# Patient Record
Sex: FEMALE | Race: WHITE
[De-identification: names, ages, dates, MRNs, and addresses within clinical notes are randomized per-mention and may not be internally consistent; named-entity substitution may affect disease eponyms.]

---

## 2019-06-12 ENCOUNTER — HOSPITAL ENCOUNTER (INPATIENT)
Dept: HOSPITAL 19 - COL.ER | Age: 79
LOS: 3 days | Discharge: HOME | DRG: 291 | End: 2019-06-15
Attending: INTERNAL MEDICINE | Admitting: INTERNAL MEDICINE
Payer: MEDICARE

## 2019-06-12 VITALS — WEIGHT: 216.05 LBS | BODY MASS INDEX: 46.61 KG/M2 | HEIGHT: 57.01 IN

## 2019-06-12 DIAGNOSIS — N17.9: ICD-10-CM

## 2019-06-12 DIAGNOSIS — R00.1: ICD-10-CM

## 2019-06-12 DIAGNOSIS — Z79.4: ICD-10-CM

## 2019-06-12 DIAGNOSIS — M10.9: ICD-10-CM

## 2019-06-12 DIAGNOSIS — G47.34: ICD-10-CM

## 2019-06-12 DIAGNOSIS — E11.22: ICD-10-CM

## 2019-06-12 DIAGNOSIS — N18.4: ICD-10-CM

## 2019-06-12 DIAGNOSIS — E66.01: ICD-10-CM

## 2019-06-12 DIAGNOSIS — E03.9: ICD-10-CM

## 2019-06-12 DIAGNOSIS — J44.9: ICD-10-CM

## 2019-06-12 DIAGNOSIS — E87.79: ICD-10-CM

## 2019-06-12 DIAGNOSIS — I25.10: ICD-10-CM

## 2019-06-12 DIAGNOSIS — Z88.2: ICD-10-CM

## 2019-06-12 DIAGNOSIS — I50.33: ICD-10-CM

## 2019-06-12 DIAGNOSIS — D63.1: ICD-10-CM

## 2019-06-12 DIAGNOSIS — E78.5: ICD-10-CM

## 2019-06-12 DIAGNOSIS — E83.52: ICD-10-CM

## 2019-06-12 DIAGNOSIS — M25.532: ICD-10-CM

## 2019-06-12 DIAGNOSIS — M25.531: ICD-10-CM

## 2019-06-12 DIAGNOSIS — I13.0: Primary | ICD-10-CM

## 2019-06-12 DIAGNOSIS — E87.1: ICD-10-CM

## 2019-06-13 VITALS — SYSTOLIC BLOOD PRESSURE: 140 MMHG | TEMPERATURE: 98.3 F | DIASTOLIC BLOOD PRESSURE: 72 MMHG | HEART RATE: 58 BPM

## 2019-06-13 VITALS — SYSTOLIC BLOOD PRESSURE: 125 MMHG | TEMPERATURE: 97.6 F | HEART RATE: 44 BPM | DIASTOLIC BLOOD PRESSURE: 42 MMHG

## 2019-06-13 VITALS — SYSTOLIC BLOOD PRESSURE: 146 MMHG | DIASTOLIC BLOOD PRESSURE: 42 MMHG | TEMPERATURE: 97.6 F | HEART RATE: 61 BPM

## 2019-06-13 VITALS — SYSTOLIC BLOOD PRESSURE: 146 MMHG | DIASTOLIC BLOOD PRESSURE: 42 MMHG | HEART RATE: 61 BPM | TEMPERATURE: 97.6 F

## 2019-06-13 VITALS — DIASTOLIC BLOOD PRESSURE: 57 MMHG | TEMPERATURE: 98.6 F | SYSTOLIC BLOOD PRESSURE: 141 MMHG | HEART RATE: 51 BPM

## 2019-06-13 VITALS — TEMPERATURE: 97.8 F | DIASTOLIC BLOOD PRESSURE: 42 MMHG | HEART RATE: 48 BPM | SYSTOLIC BLOOD PRESSURE: 126 MMHG

## 2019-06-13 VITALS — DIASTOLIC BLOOD PRESSURE: 131 MMHG | SYSTOLIC BLOOD PRESSURE: 153 MMHG | TEMPERATURE: 97.6 F | HEART RATE: 58 BPM

## 2019-06-13 LAB
ALBUMIN SERPL-MCNC: 3.9 GM/DL (ref 3.5–5)
ALBUMIN SERPL-MCNC: 4.2 GM/DL (ref 3.5–5)
ALP SERPL-CCNC: 122 U/L (ref 50–136)
ALP SERPL-CCNC: 143 U/L (ref 50–136)
ALT SERPL-CCNC: 11 U/L (ref 9–52)
ALT SERPL-CCNC: < 6 U/L (ref 9–52)
ANION GAP SERPL CALC-SCNC: 16 MMOL/L (ref 7–16)
ANION GAP SERPL CALC-SCNC: 17 MMOL/L (ref 7–16)
ANION GAP SERPL CALC-SCNC: 19 MMOL/L (ref 7–16)
AST SERPL-CCNC: 36 U/L (ref 15–37)
AST SERPL-CCNC: 39 U/L (ref 15–37)
BASOPHILS # BLD: 0.1 10*3/UL (ref 0–0.2)
BASOPHILS # BLD: 0.1 10*3/UL (ref 0–0.2)
BASOPHILS NFR BLD AUTO: 0.4 % (ref 0–2)
BASOPHILS NFR BLD AUTO: 0.4 % (ref 0–2)
BILIRUB SERPL-MCNC: 0.5 MG/DL (ref 0–1)
BILIRUB SERPL-MCNC: 0.5 MG/DL (ref 0–1)
BUN SERPL-MCNC: 147 MG/DL (ref 7–17)
BUN SERPL-MCNC: 150 MG/DL (ref 7–17)
BUN SERPL-MCNC: 151 MG/DL (ref 7–17)
CALCIUM SERPL-MCNC: 11.7 MG/DL (ref 8.4–10.2)
CALCIUM SERPL-MCNC: 12.1 MG/DL (ref 8.4–10.2)
CALCIUM SERPL-MCNC: 12.5 MG/DL (ref 8.4–10.2)
CHLORIDE SERPL-SCNC: 81 MMOL/L (ref 98–107)
CHLORIDE SERPL-SCNC: 81 MMOL/L (ref 98–107)
CHLORIDE SERPL-SCNC: 83 MMOL/L (ref 98–107)
CO2 SERPL-SCNC: 27 MMOL/L (ref 22–30)
CO2 SERPL-SCNC: 28 MMOL/L (ref 22–30)
CO2 SERPL-SCNC: 28 MMOL/L (ref 22–30)
CREAT SERPL-SCNC: 4.83 UMOL/L (ref 0.52–1.25)
CREAT SERPL-SCNC: 4.91 UMOL/L (ref 0.52–1.25)
CREAT SERPL-SCNC: 5.09 UMOL/L (ref 0.52–1.25)
CRP SERPL-MCNC: 5.6 MG/DL (ref 0–0.9)
EOSINOPHIL # BLD: 0.2 10*3/UL (ref 0–0.7)
EOSINOPHIL # BLD: 0.3 10*3/UL (ref 0–0.7)
EOSINOPHIL NFR BLD: 1.7 % (ref 0–4)
EOSINOPHIL NFR BLD: 1.8 % (ref 0–4)
ERYTHROCYTE [DISTWIDTH] IN BLOOD BY AUTOMATED COUNT: 15.4 % (ref 11.5–14.5)
ERYTHROCYTE [DISTWIDTH] IN BLOOD BY AUTOMATED COUNT: 15.4 % (ref 11.5–14.5)
ERYTHROCYTE [SEDIMENTATION RATE] IN BLOOD: > 140 MM/HR (ref 0–30)
GLUCOSE SERPL-MCNC: 186 MG/DL (ref 74–106)
GLUCOSE SERPL-MCNC: 218 MG/DL (ref 74–106)
GLUCOSE SERPL-MCNC: 228 MG/DL (ref 74–106)
GRANULOCYTES # BLD AUTO: 80.2 % (ref 42.2–75.2)
GRANULOCYTES # BLD AUTO: 82.1 % (ref 42.2–75.2)
HCT VFR BLD AUTO: 28.1 % (ref 37–47)
HCT VFR BLD AUTO: 30.7 % (ref 37–47)
HGB BLD-MCNC: 10.5 G/DL (ref 12.5–16)
HGB BLD-MCNC: 9.4 G/DL (ref 12.5–16)
LYMPHOCYTES # BLD: 1.4 10*3/UL (ref 1.2–3.4)
LYMPHOCYTES # BLD: 1.4 10*3/UL (ref 1.2–3.4)
LYMPHOCYTES NFR BLD: 9.6 % (ref 20–51)
LYMPHOCYTES NFR BLD: 9.8 % (ref 20–51)
MAGNESIUM SERPL-MCNC: 1.3 MG/DL (ref 1.6–2.3)
MCH RBC QN AUTO: 29 PG (ref 27–31)
MCH RBC QN AUTO: 30 PG (ref 27–31)
MCHC RBC AUTO-ENTMCNC: 34 G/DL (ref 33–37)
MCHC RBC AUTO-ENTMCNC: 34 G/DL (ref 33–37)
MCV RBC AUTO: 87 FL (ref 80–100)
MCV RBC AUTO: 88 FL (ref 80–100)
MONOCYTES # BLD: 0.8 10*3/UL (ref 0.1–0.6)
MONOCYTES # BLD: 1.1 10*3/UL (ref 0.1–0.6)
MONOCYTES NFR BLD AUTO: 5.5 % (ref 1.7–9.3)
MONOCYTES NFR BLD AUTO: 7.4 % (ref 1.7–9.3)
NEUTROPHILS # BLD: 11.6 10*3/UL (ref 1.4–6.5)
NEUTROPHILS # BLD: 11.8 10*3/UL (ref 1.4–6.5)
PHOSPHATE SERPL-MCNC: 4.7 MG/DL (ref 2.5–4.5)
PLATELET # BLD AUTO: 226 K/MM3 (ref 130–400)
PLATELET # BLD AUTO: 238 K/MM3 (ref 130–400)
PMV BLD AUTO: 11.3 FL (ref 7.4–10.4)
PMV BLD AUTO: 11.3 FL (ref 7.4–10.4)
POTASSIUM SERPL-SCNC: 3.5 MMOL/L (ref 3.4–5)
POTASSIUM SERPL-SCNC: 3.8 MMOL/L (ref 3.4–5)
POTASSIUM SERPL-SCNC: 3.9 MMOL/L (ref 3.4–5)
PROT SERPL-MCNC: 7.5 GM/DL (ref 6.4–8.2)
PROT SERPL-MCNC: 8.2 GM/DL (ref 6.4–8.2)
RBC # BLD AUTO: 3.19 M/MM3 (ref 4.1–5.3)
RBC # BLD AUTO: 3.55 M/MM3 (ref 4.1–5.3)
SODIUM SERPL-SCNC: 126 MMOL/L (ref 137–145)
SODIUM SERPL-SCNC: 127 MMOL/L (ref 137–145)
SODIUM SERPL-SCNC: 128 MMOL/L (ref 137–145)
TROPONIN I SERPL-MCNC: 0.06 NG/ML (ref 0–0.04)
TROPONIN-I 3 HR POST INITIAL: 0.06 NG/ML (ref 0–0.03)
URINE PROTEIN:CREAT RATIO: 1.22 (ref 0–0.14)

## 2019-06-13 NOTE — NUR
NOHEMI followed up with the patient to then discuss physical therapies
recommendation of post-acute rehab vs home health. The patient reports that
she would still prefer to return home with home health. NOHEMI provided the
patient with Medicare.gov's list of home health agencies that serve
[place]. The patient reports that she would like to discuss the options
with her  before making a decision. SW to follow up with the patient
tomorrow, 6/14.

## 2019-06-13 NOTE — NUR
NOHEMI met with the patient to discuss discharge plan. The patient lives in
[place] with her , Carter. She reports that she sometimes needs
assistance with getting to the restroom and does not have any DME. She states
that her  helps her with getting to the restroom. The patient's PCP is
Dr. Suresh Hughes and she receives her medications at [facility] in
[place]. She reports no difficulties obtaining her meds. The patient does
not have advanced directives in EMR, but she states that she does have them
completed and at home. NOHEMI then discussed occupational therapies recommendation
of post-acute rehab vs home health. The patient reports that her and her
 do just fine at home. She states that she would be agreeable to home
health though. SW to provide the patient with Medicare.gov's list of home
health agencies that serve [place] and will continue to follow.

## 2019-06-13 NOTE — NUR
Report received from RACHEL Samuel. RACHEL Samuel called for cardio consult
with Dr. Pack. Dr. Pack contacted this nurse for confirmation.

## 2019-06-13 NOTE — NUR
patient had uneventful day. Rated pain a 2/10 at highest today, declined
needing medication for pain. Report given to RACHEL Boyer. No other needs at this
time.

## 2019-06-13 NOTE — NUR
Patient assessment complete and charted. Patient laying in bed upon entry.
Denies chest pain, SOB, N/V, dizziness. C/O bilateral leg pain. BLE edema 1+.
Skin is tight and wrinkly. Patient on room air. RAC IV patent. Mild bruising
to mid back, per patient "I don't know, I can't see back there", when asked
about bruising or whether she had fallen. Denies other needs at this time.
Call light within reach.

## 2019-06-13 NOTE — NUR
Arrived to medical floor via wheel chair. Assessment complete. Lungs clear.
Heart sounds normal. Bowels active x4. Pulses strong throughout. Bilateral
lower leg edema +1. Reports bilateral lower leg pain 6/10. Bruising to right
hand present. INT in right AC without complications. Orientated to medical
floor and call light. Denies needs. All questions answered.

## 2019-06-13 NOTE — NUR
PT IN BED WITH HOB AT 30 DEGREE ANGLE, PT WAS IN RECLINER AND ASSISTED TO
BATHROOM AND TO BED. PT AMBULATED WITH WALKER AND GAIT STEADY. PT DENIES PAIN
OR DISCOMFORT AND IS AWARE THAT SHE IS NPO AT MIDNIGHT. NO NEEDS AT THIS TIME,
CALL LIGHT WITHIN REACH.

## 2019-06-14 VITALS — DIASTOLIC BLOOD PRESSURE: 34 MMHG | TEMPERATURE: 97.9 F | SYSTOLIC BLOOD PRESSURE: 118 MMHG | HEART RATE: 45 BPM

## 2019-06-14 VITALS — DIASTOLIC BLOOD PRESSURE: 41 MMHG | HEART RATE: 58 BPM | TEMPERATURE: 97.6 F | SYSTOLIC BLOOD PRESSURE: 147 MMHG

## 2019-06-14 VITALS — SYSTOLIC BLOOD PRESSURE: 125 MMHG | DIASTOLIC BLOOD PRESSURE: 41 MMHG | HEART RATE: 46 BPM

## 2019-06-14 VITALS — TEMPERATURE: 98.2 F | HEART RATE: 58 BPM | DIASTOLIC BLOOD PRESSURE: 53 MMHG | SYSTOLIC BLOOD PRESSURE: 147 MMHG

## 2019-06-14 LAB
ANION GAP SERPL CALC-SCNC: 15 MMOL/L (ref 7–16)
BASOPHILS # BLD: 0.1 10*3/UL (ref 0–0.2)
BASOPHILS NFR BLD AUTO: 0.5 % (ref 0–2)
BUN SERPL-MCNC: 143 MG/DL (ref 7–17)
CALCIUM SERPL-MCNC: 11.1 MG/DL (ref 8.4–10.2)
CHLORIDE SERPL-SCNC: 86 MMOL/L (ref 98–107)
CO2 SERPL-SCNC: 28 MMOL/L (ref 22–30)
CREAT SERPL-SCNC: 4.73 UMOL/L (ref 0.52–1.25)
EOSINOPHIL # BLD: 0.4 10*3/UL (ref 0–0.7)
EOSINOPHIL NFR BLD: 3.6 % (ref 0–4)
ERYTHROCYTE [DISTWIDTH] IN BLOOD BY AUTOMATED COUNT: 15.4 % (ref 11.5–14.5)
GLUCOSE SERPL-MCNC: 109 MG/DL (ref 74–106)
GRANULOCYTES # BLD AUTO: 77.2 % (ref 42.2–75.2)
HCT VFR BLD AUTO: 26.5 % (ref 37–47)
HGB BLD-MCNC: 8.8 G/DL (ref 12.5–16)
INR BLD: 1.7 (ref 0.8–3)
LYMPHOCYTES # BLD: 1.3 10*3/UL (ref 1.2–3.4)
LYMPHOCYTES NFR BLD: 11.2 % (ref 20–51)
MAGNESIUM SERPL-MCNC: 1.9 MG/DL (ref 1.6–2.3)
MCH RBC QN AUTO: 30 PG (ref 27–31)
MCHC RBC AUTO-ENTMCNC: 33 G/DL (ref 33–37)
MCV RBC AUTO: 89 FL (ref 80–100)
MONOCYTES # BLD: 0.8 10*3/UL (ref 0.1–0.6)
MONOCYTES NFR BLD AUTO: 7 % (ref 1.7–9.3)
NEUTROPHILS # BLD: 9.1 10*3/UL (ref 1.4–6.5)
PLATELET # BLD AUTO: 228 K/MM3 (ref 130–400)
PMV BLD AUTO: 11.1 FL (ref 7.4–10.4)
POTASSIUM SERPL-SCNC: 3.5 MMOL/L (ref 3.4–5)
PROTHROMBIN TIME: 20.4 SECONDS (ref 9.7–12.8)
RBC # BLD AUTO: 2.98 M/MM3 (ref 4.1–5.3)
SODIUM SERPL-SCNC: 129 MMOL/L (ref 137–145)

## 2019-06-14 NOTE — NUR
UNEVENTFUL NIGHT FOR PT. PT WANTED TO SLEEP WITH 02/NC ON AT 2L, BECAUSE SHE
HAS DONE SO FOR YEARS AND FEELS BETTER WITH IT ON. O2 PLACED ON PT AS
REQUESTED. PT RESTED/SLEPT FOR A FEW HOURS. PT DENIES PAIN OR DISCOMFORT AND
HAS NO NEEDS AT THIS TIME, CALL LIGHT WITHIN REACH.

## 2019-06-14 NOTE — NUR
SW followed up with the patient, patient's , and the patient's daughter
about home health. The patient reports that she has an indoor pool at home and
swims in it everyday. She states that she is not interested in home health at
this time. The patient's family was supportive of her decision. No other
identified needs at this time, but SW to continue to follow.

## 2019-06-14 NOTE — NUR
PT SITTING IN RECLINER, ASSISTED TO BATHROOM AND TO BED. PT'S GAIT UNSTEADY.
PT HAS TO HOLD ONTO FURNITURE TO GET AROUND. OFFERED TO GET WALKER AND TO HAVE
HER HOLD ONTO ME; BUT PT REFUSED. PT WAS INCONTINENT OF URINE. PT HAS PAIN IN
LOWER BACK, GAVE TYLENOL FOR PAIN. NO FURTHER NEEDS AT THIS TIME. CALL LIGHT
WITHIN REACH.

## 2019-06-14 NOTE — NUR
Pt has had no issues during the day, she has been sitting up in the recliner
with no C/O pain / discomfort, Pt has been ambulatory in room.

## 2019-06-15 VITALS — DIASTOLIC BLOOD PRESSURE: 47 MMHG | TEMPERATURE: 97.5 F | HEART RATE: 53 BPM | SYSTOLIC BLOOD PRESSURE: 132 MMHG

## 2019-06-15 VITALS — DIASTOLIC BLOOD PRESSURE: 51 MMHG | SYSTOLIC BLOOD PRESSURE: 150 MMHG | TEMPERATURE: 97.6 F | HEART RATE: 67 BPM

## 2019-06-15 VITALS — HEART RATE: 50 BPM | SYSTOLIC BLOOD PRESSURE: 137 MMHG | DIASTOLIC BLOOD PRESSURE: 43 MMHG | TEMPERATURE: 97.4 F

## 2019-06-15 VITALS — TEMPERATURE: 97.4 F | DIASTOLIC BLOOD PRESSURE: 36 MMHG | SYSTOLIC BLOOD PRESSURE: 125 MMHG | HEART RATE: 55 BPM

## 2019-06-15 VITALS — DIASTOLIC BLOOD PRESSURE: 27 MMHG | SYSTOLIC BLOOD PRESSURE: 116 MMHG | TEMPERATURE: 97.2 F | HEART RATE: 54 BPM

## 2019-06-15 LAB
ALBUMIN SERPL-MCNC: 3.7 GM/DL (ref 3.5–5)
ALP SERPL-CCNC: 118 U/L (ref 50–136)
ALT SERPL-CCNC: 7 U/L (ref 9–52)
ANION GAP SERPL CALC-SCNC: 17 MMOL/L (ref 7–16)
AST SERPL-CCNC: 36 U/L (ref 15–37)
BASOPHILS # BLD: 0.1 10*3/UL (ref 0–0.2)
BASOPHILS NFR BLD AUTO: 0.4 % (ref 0–2)
BILIRUB SERPL-MCNC: 0.2 MG/DL (ref 0–1)
BUN SERPL-MCNC: 149 MG/DL (ref 7–17)
CALCIUM SERPL-MCNC: 10.3 MG/DL (ref 8.4–10.2)
CHLORIDE SERPL-SCNC: 85 MMOL/L (ref 98–107)
CO2 SERPL-SCNC: 28 MMOL/L (ref 22–30)
CREAT SERPL-SCNC: 4.77 UMOL/L (ref 0.52–1.25)
EOSINOPHIL # BLD: 0.5 10*3/UL (ref 0–0.7)
EOSINOPHIL NFR BLD: 4 % (ref 0–4)
ERYTHROCYTE [DISTWIDTH] IN BLOOD BY AUTOMATED COUNT: 15.6 % (ref 11.5–14.5)
GLUCOSE SERPL-MCNC: 117 MG/DL (ref 74–106)
GRANULOCYTES # BLD AUTO: 76.6 % (ref 42.2–75.2)
HCT VFR BLD AUTO: 27.1 % (ref 37–47)
HGB BLD-MCNC: 8.9 G/DL (ref 12.5–16)
INR BLD: 1.8 (ref 0.8–3)
LYMPHOCYTES # BLD: 1.3 10*3/UL (ref 1.2–3.4)
LYMPHOCYTES NFR BLD: 11.6 % (ref 20–51)
MCH RBC QN AUTO: 29 PG (ref 27–31)
MCHC RBC AUTO-ENTMCNC: 33 G/DL (ref 33–37)
MCV RBC AUTO: 89 FL (ref 80–100)
MONOCYTES # BLD: 0.8 10*3/UL (ref 0.1–0.6)
MONOCYTES NFR BLD AUTO: 6.9 % (ref 1.7–9.3)
NEUTROPHILS # BLD: 8.8 10*3/UL (ref 1.4–6.5)
PLATELET # BLD AUTO: 226 K/MM3 (ref 130–400)
PMV BLD AUTO: 11.4 FL (ref 7.4–10.4)
POTASSIUM SERPL-SCNC: 3.5 MMOL/L (ref 3.4–5)
PROT SERPL-MCNC: 7.3 GM/DL (ref 6.4–8.2)
PROTHROMBIN TIME: 20.8 SECONDS (ref 9.7–12.8)
RBC # BLD AUTO: 3.03 M/MM3 (ref 4.1–5.3)
SODIUM SERPL-SCNC: 130 MMOL/L (ref 137–145)

## 2019-06-15 NOTE — NUR
Received report, patient is sitting up in recliner reading menu.  Is finishing
dose of IV antibiotic.  Denies having any pain other than her usual chronic
back pain.  Respirations are even and non labored.  Is not wearing oxygen at
this time and denies having shortness of breath.  Personal items and call
light are within reach.  Will notify staff of any needs.

## 2019-06-15 NOTE — NUR
PT SLEPT A FEW HOURS DURING THE NIGHT, ASSISTED PT UP TO BATHROOM AND THEN TO
RECLINER. PT USED WALKER TO AMBULATE AND GAIT WAS STEADY. PT'S BLE +1 EDEMA
AND LLE LITE RED IN COLOR. PT DENIES PAIN OR DISCOMFORT. GAVE SNACK TO PT. PT
IN RECLINER SITTING UP EATING SNACK AND WATCHING TV AT THIS TIME. CALL LIGHT
WITHIN REACH AND CHAIR ALARM ON.

## 2019-06-15 NOTE — NUR
PT UP IN RECLINER, WATCHING TV AND IS ORDERING BREAKFAST AT THIS TIME. NO
NEEDS AND CALL LIGHT WITHIN REACH.

## 2019-06-15 NOTE — NUR
Patient discharged home with .  Assisted to private vehicle via
wheelchair.  Was accompanied by staff.  Personal items and discharge
instructions sent with patient.

## 2019-06-21 ENCOUNTER — HOSPITAL ENCOUNTER (INPATIENT)
Dept: HOSPITAL 19 - COL.ER | Age: 79
LOS: 5 days | Discharge: HOME HEALTH | DRG: 673 | End: 2019-06-26
Attending: INTERNAL MEDICINE | Admitting: INTERNAL MEDICINE
Payer: MEDICARE

## 2019-06-21 VITALS — HEART RATE: 80 BPM | SYSTOLIC BLOOD PRESSURE: 118 MMHG | DIASTOLIC BLOOD PRESSURE: 48 MMHG

## 2019-06-21 VITALS — SYSTOLIC BLOOD PRESSURE: 131 MMHG | DIASTOLIC BLOOD PRESSURE: 43 MMHG | HEART RATE: 93 BPM | TEMPERATURE: 99 F

## 2019-06-21 VITALS — DIASTOLIC BLOOD PRESSURE: 71 MMHG | HEART RATE: 91 BPM | SYSTOLIC BLOOD PRESSURE: 158 MMHG

## 2019-06-21 VITALS — DIASTOLIC BLOOD PRESSURE: 46 MMHG | TEMPERATURE: 98.5 F | SYSTOLIC BLOOD PRESSURE: 137 MMHG | HEART RATE: 87 BPM

## 2019-06-21 VITALS — BODY MASS INDEX: 36.45 KG/M2 | WEIGHT: 162.04 LBS | HEIGHT: 55.98 IN

## 2019-06-21 VITALS — SYSTOLIC BLOOD PRESSURE: 136 MMHG | DIASTOLIC BLOOD PRESSURE: 65 MMHG | HEART RATE: 81 BPM

## 2019-06-21 VITALS — HEART RATE: 72 BPM | SYSTOLIC BLOOD PRESSURE: 113 MMHG | DIASTOLIC BLOOD PRESSURE: 44 MMHG

## 2019-06-21 VITALS — HEART RATE: 82 BPM | TEMPERATURE: 98.1 F | DIASTOLIC BLOOD PRESSURE: 45 MMHG | SYSTOLIC BLOOD PRESSURE: 122 MMHG

## 2019-06-21 VITALS — HEART RATE: 72 BPM | DIASTOLIC BLOOD PRESSURE: 46 MMHG | SYSTOLIC BLOOD PRESSURE: 128 MMHG | TEMPERATURE: 97.6 F

## 2019-06-21 VITALS — DIASTOLIC BLOOD PRESSURE: 65 MMHG | HEART RATE: 80 BPM | SYSTOLIC BLOOD PRESSURE: 154 MMHG

## 2019-06-21 DIAGNOSIS — F41.9: ICD-10-CM

## 2019-06-21 DIAGNOSIS — D63.1: ICD-10-CM

## 2019-06-21 DIAGNOSIS — K21.9: ICD-10-CM

## 2019-06-21 DIAGNOSIS — N18.6: ICD-10-CM

## 2019-06-21 DIAGNOSIS — F32.9: ICD-10-CM

## 2019-06-21 DIAGNOSIS — Z88.2: ICD-10-CM

## 2019-06-21 DIAGNOSIS — Z79.82: ICD-10-CM

## 2019-06-21 DIAGNOSIS — J44.9: ICD-10-CM

## 2019-06-21 DIAGNOSIS — I12.0: Primary | ICD-10-CM

## 2019-06-21 DIAGNOSIS — E11.22: ICD-10-CM

## 2019-06-21 DIAGNOSIS — E11.21: ICD-10-CM

## 2019-06-21 DIAGNOSIS — Z79.4: ICD-10-CM

## 2019-06-21 DIAGNOSIS — E66.9: ICD-10-CM

## 2019-06-21 LAB
ALBUMIN SERPL-MCNC: 4 GM/DL (ref 3.5–5)
ALBUMIN SERPL-MCNC: 4 GM/DL (ref 3.5–5)
ALP SERPL-CCNC: 132 U/L (ref 50–136)
ALT SERPL-CCNC: 20 U/L (ref 9–52)
ANION GAP SERPL CALC-SCNC: 20 MMOL/L (ref 7–16)
ANION GAP SERPL CALC-SCNC: 22 MMOL/L (ref 7–16)
AST SERPL-CCNC: 38 U/L (ref 15–37)
BASOPHILS # BLD: 0.1 10*3/UL (ref 0–0.2)
BASOPHILS NFR BLD AUTO: 0.4 % (ref 0–2)
BILIRUB SERPL-MCNC: 0.5 MG/DL (ref 0–1)
BUN SERPL-MCNC: > 120 MG/DL (ref 7–17)
BUN SERPL-MCNC: > 120 MG/DL (ref 7–17)
CALCIUM SERPL-MCNC: 8.1 MG/DL (ref 8.4–10.2)
CALCIUM SERPL-MCNC: 8.1 MG/DL (ref 8.4–10.2)
CHLORIDE SERPL-SCNC: 81 MMOL/L (ref 98–107)
CHLORIDE SERPL-SCNC: 82 MMOL/L (ref 98–107)
CO2 SERPL-SCNC: 21 MMOL/L (ref 22–30)
CO2 SERPL-SCNC: 22 MMOL/L (ref 22–30)
CREAT SERPL-SCNC: 4.61 UMOL/L (ref 0.52–1.25)
CREAT SERPL-SCNC: 4.66 UMOL/L (ref 0.52–1.25)
EOSINOPHIL # BLD: 0.3 10*3/UL (ref 0–0.7)
EOSINOPHIL NFR BLD: 2.5 % (ref 0–4)
ERYTHROCYTE [DISTWIDTH] IN BLOOD BY AUTOMATED COUNT: 15.8 % (ref 11.5–14.5)
GLUCOSE SERPL-MCNC: 147 MG/DL (ref 74–106)
GLUCOSE SERPL-MCNC: 152 MG/DL (ref 74–106)
GRANULOCYTES # BLD AUTO: 81.1 % (ref 42.2–75.2)
HCT VFR BLD AUTO: 27.1 % (ref 37–47)
HGB BLD-MCNC: 9.2 G/DL (ref 12.5–16)
INR BLD: 1.7 (ref 0.8–3)
LYMPHOCYTES # BLD: 1.2 10*3/UL (ref 1.2–3.4)
LYMPHOCYTES NFR BLD: 9.7 % (ref 20–51)
MAGNESIUM SERPL-MCNC: 1.1 MG/DL (ref 1.6–2.3)
MCH RBC QN AUTO: 30 PG (ref 27–31)
MCHC RBC AUTO-ENTMCNC: 34 G/DL (ref 33–37)
MCV RBC AUTO: 87 FL (ref 80–100)
MONOCYTES # BLD: 0.7 10*3/UL (ref 0.1–0.6)
MONOCYTES NFR BLD AUTO: 5.7 % (ref 1.7–9.3)
NEUTROPHILS # BLD: 10 10*3/UL (ref 1.4–6.5)
PH UR STRIP.AUTO: 5 [PH] (ref 5–8)
PHOSPHATE SERPL-MCNC: 5.8 MG/DL (ref 2.5–4.5)
PHOSPHATE SERPL-MCNC: 5.8 MG/DL (ref 2.5–4.5)
PLATELET # BLD AUTO: 215 K/MM3 (ref 130–400)
PMV BLD AUTO: 11.1 FL (ref 7.4–10.4)
POTASSIUM SERPL-SCNC: 3.3 MMOL/L (ref 3.4–5)
POTASSIUM SERPL-SCNC: 3.4 MMOL/L (ref 3.4–5)
PROT SERPL-MCNC: 7.8 GM/DL (ref 6.4–8.2)
PROTHROMBIN TIME: 20.7 SECONDS (ref 9.7–12.8)
RBC # BLD AUTO: 3.11 M/MM3 (ref 4.1–5.3)
RBC # UR STRIP.AUTO: (no result) /UL
RBC # UR: (no result) /HPF
SODIUM SERPL-SCNC: 123 MMOL/L (ref 137–145)
SODIUM SERPL-SCNC: 125 MMOL/L (ref 137–145)
SP GR UR STRIP.AUTO: 1 (ref 1–1.03)
SQUAMOUS # URNS: (no result) /HPF
UA DIPSTICK PNL UR STRIP.AUTO: (no result)
URN COLLECT METHOD CLASS: (no result)

## 2019-06-21 PROCEDURE — C9113 INJ PANTOPRAZOLE SODIUM, VIA: HCPCS

## 2019-06-21 PROCEDURE — G0365 VESSEL MAPPING HEMO ACCESS: HCPCS

## 2019-06-21 PROCEDURE — C1750 CATH, HEMODIALYSIS,LONG-TERM: HCPCS

## 2019-06-21 PROCEDURE — C1768 GRAFT, VASCULAR: HCPCS

## 2019-06-21 PROCEDURE — C1892 INTRO/SHEATH,FIXED,PEEL-AWAY: HCPCS

## 2019-06-21 NOTE — NUR
Assessment completed, alert/oriented, vital signs stable, denies pain, reports
changes in her ability to void urine, hx of CKD and have acute issues,
 is consulted and will place tunneled diaylsis catheter at 1430,
Patient has signed informed consent,  present at bedside, she has been
NPO and denies other needs or concerns at this time

## 2019-06-21 NOTE — NUR
PROCEDURE [id number]. VITAL SIGNS TAKEN EVERY 5 MIN AT THE START OF CONSCIOUS
SEDATION AT 1447
1450 bp 147/65 P-79 R-18 SA02-94%
1455 /67 P-77 R-16 SA02-98%
1500 /60 P-77 R-18 SA02%-97
1505 /63 P-78 R-14 SA02%-97
1510 /65 P-83 R-16 SA02%-97
1515 /65 P-79 R-16 SA02%-97
1520 /69 P-86 R-1 SA02%-97
1525 /65 P-88 R-16 SA02%-96
1530 /71 P-91 R-14 SA02%-93 ON ROOM AIR

## 2019-06-21 NOTE — NUR
Patient arrived back to Medical floor from Cath lab/ dialysis cath placement
at 1545, she is alert/oriented, vital signs stable, dneies pain, insertion
site is soft, no hematoma noted and dressing is C/D/I, she is tolerating PO
intake well, denies other, needs will continue to monitor

## 2019-06-21 NOTE — NUR
PT IN BED WITH HOB ELEVATED TO 60 DEGREE ANGLE. PT DENIES PAIN OR DISCOMFORT.
PT ADVISED OF FLUID RESTRICTION AND VERBALIZED UNDERSTANDING. WAS GIVEN
PUDDING FOR A SNACK. NO OTHER NEEDS. CALL LIGHT WITHIN REACH.

## 2019-06-22 VITALS — DIASTOLIC BLOOD PRESSURE: 47 MMHG | TEMPERATURE: 97.9 F | HEART RATE: 98 BPM | SYSTOLIC BLOOD PRESSURE: 142 MMHG

## 2019-06-22 VITALS — DIASTOLIC BLOOD PRESSURE: 45 MMHG | SYSTOLIC BLOOD PRESSURE: 130 MMHG | TEMPERATURE: 98.6 F | HEART RATE: 103 BPM

## 2019-06-22 VITALS — HEART RATE: 95 BPM | DIASTOLIC BLOOD PRESSURE: 43 MMHG | TEMPERATURE: 98.2 F | SYSTOLIC BLOOD PRESSURE: 148 MMHG

## 2019-06-22 VITALS — SYSTOLIC BLOOD PRESSURE: 147 MMHG | HEART RATE: 97 BPM | DIASTOLIC BLOOD PRESSURE: 54 MMHG | TEMPERATURE: 98.5 F

## 2019-06-22 VITALS — DIASTOLIC BLOOD PRESSURE: 44 MMHG | SYSTOLIC BLOOD PRESSURE: 108 MMHG | HEART RATE: 81 BPM

## 2019-06-22 VITALS — SYSTOLIC BLOOD PRESSURE: 126 MMHG | DIASTOLIC BLOOD PRESSURE: 52 MMHG | HEART RATE: 100 BPM | TEMPERATURE: 98.5 F

## 2019-06-22 LAB
ALBUMIN SERPL-MCNC: 3.6 GM/DL (ref 3.5–5)
ANION GAP SERPL CALC-SCNC: 17 MMOL/L (ref 7–16)
BASOPHILS # BLD: 0 10*3/UL (ref 0–0.2)
BASOPHILS NFR BLD AUTO: 0.3 % (ref 0–2)
BUN SERPL-MCNC: 141 MG/DL (ref 7–17)
CALCIUM SERPL-MCNC: 8 MG/DL (ref 8.4–10.2)
CHLORIDE SERPL-SCNC: 86 MMOL/L (ref 98–107)
CO2 SERPL-SCNC: 21 MMOL/L (ref 22–30)
CREAT SERPL-SCNC: 4.26 UMOL/L (ref 0.52–1.25)
EOSINOPHIL # BLD: 0.1 10*3/UL (ref 0–0.7)
EOSINOPHIL NFR BLD: 1.2 % (ref 0–4)
ERYTHROCYTE [DISTWIDTH] IN BLOOD BY AUTOMATED COUNT: 15.9 % (ref 11.5–14.5)
GLUCOSE SERPL-MCNC: 223 MG/DL (ref 74–106)
GRANULOCYTES # BLD AUTO: 88 % (ref 42.2–75.2)
HBV SURFACE AB SER QL IA: <2
HCT VFR BLD AUTO: 25.5 % (ref 37–47)
HGB BLD-MCNC: 8.5 G/DL (ref 12.5–16)
LYMPHOCYTES # BLD: 0.7 10*3/UL (ref 1.2–3.4)
LYMPHOCYTES NFR BLD: 5.5 % (ref 20–51)
MCH RBC QN AUTO: 29 PG (ref 27–31)
MCHC RBC AUTO-ENTMCNC: 33 G/DL (ref 33–37)
MCV RBC AUTO: 88 FL (ref 80–100)
MONOCYTES # BLD: 0.5 10*3/UL (ref 0.1–0.6)
MONOCYTES NFR BLD AUTO: 4.4 % (ref 1.7–9.3)
NEUTROPHILS # BLD: 10.4 10*3/UL (ref 1.4–6.5)
PHOSPHATE SERPL-MCNC: 6.1 MG/DL (ref 2.5–4.5)
PLATELET # BLD AUTO: 210 K/MM3 (ref 130–400)
PMV BLD AUTO: 11.6 FL (ref 7.4–10.4)
POTASSIUM SERPL-SCNC: 3.6 MMOL/L (ref 3.4–5)
RBC # BLD AUTO: 2.91 M/MM3 (ref 4.1–5.3)
SODIUM SERPL-SCNC: 125 MMOL/L (ref 137–145)

## 2019-06-22 PROCEDURE — 5A1D70Z PERFORMANCE OF URINARY FILTRATION, INTERMITTENT, LESS THAN 6 HOURS PER DAY: ICD-10-PCS | Performed by: SURGERY

## 2019-06-22 NOTE — NUR
UNEVENTFUL NIGHT. PT STATED THAT SHE DID NOT SLEEP WELL LATE NIGHT. PT DID GET
UP A FEW TIMES TO THE BATHROOM AND BACK TO BED. PT USED ASSISTED DEVICE, A
WALKER, GAIT WAS STEADY. PT DENIED PAIN OR DISCOMFORT. CALL LIGHT WITHIN
REACH.

## 2019-06-22 NOTE — NUR
PT IN BED WITH HOB ELEVATED TO 60 DEGREE ANGLE. PT DENIES PAIN OR DISCOMFORT.
DRSG TO RIGHT UPPER CHEST, HAS SANGUINIOUS DRAINAGE, NO HEMATOMA. PT HAS NO
NEEDS AT THIS TIME, CALL LIGHT WITHIN REACH. ASSISTED TO BATHROOM AND BACK TO
BED. PT USES WALKER TO AMBULATE, GAIT STEADY AND SLOW.

## 2019-06-22 NOTE — NUR
Assessment  completed, alert/oriented, vital sings stable, denies pain or
discomfort, heart RRR, lungs CTA, tunnedled dialysis cath site looks good and
dressing is C/D/I, I have ordered her breakfast and am taking her down to
diaylsis at [facility], she denies other needs or concerns

## 2019-06-22 NOTE — NUR
Plan: Plan is to return home unless DC states otherwise. Family is waiting on
the doctor.
Assess: SW met with patient and spouse in room. Patient reports DPOA is her
  Jesus [phone]  and DTR Danielle Humphrey [phone] in
[facility]. Patient reports they reside in [facility], PCP is Dr. Coello
and RX obtained from [facility] in [facility]. Patient reports
that she use 02 at night. Patient indicated that  will transport home.
Action: SW gave options, patient is educated on service in the area is not
opposed to home health or SNF if needed, will follow.

## 2019-06-23 VITALS — HEART RATE: 95 BPM | TEMPERATURE: 98.4 F | SYSTOLIC BLOOD PRESSURE: 149 MMHG | DIASTOLIC BLOOD PRESSURE: 33 MMHG

## 2019-06-23 VITALS — SYSTOLIC BLOOD PRESSURE: 142 MMHG | DIASTOLIC BLOOD PRESSURE: 37 MMHG | HEART RATE: 95 BPM | TEMPERATURE: 98 F

## 2019-06-23 VITALS — HEART RATE: 96 BPM | TEMPERATURE: 98.6 F | DIASTOLIC BLOOD PRESSURE: 63 MMHG | SYSTOLIC BLOOD PRESSURE: 144 MMHG

## 2019-06-23 VITALS — HEART RATE: 106 BPM | TEMPERATURE: 98.3 F | SYSTOLIC BLOOD PRESSURE: 125 MMHG | DIASTOLIC BLOOD PRESSURE: 49 MMHG

## 2019-06-23 VITALS — DIASTOLIC BLOOD PRESSURE: 50 MMHG | TEMPERATURE: 98.3 F | HEART RATE: 96 BPM | SYSTOLIC BLOOD PRESSURE: 144 MMHG

## 2019-06-23 LAB
ALBUMIN SERPL-MCNC: 3.2 GM/DL (ref 3.5–5)
ANION GAP SERPL CALC-SCNC: 15 MMOL/L (ref 7–16)
BASOPHILS # BLD: 0 10*3/UL (ref 0–0.2)
BASOPHILS NFR BLD AUTO: 0.4 % (ref 0–2)
BUN SERPL-MCNC: 79 MG/DL (ref 7–17)
CALCIUM SERPL-MCNC: 8.6 MG/DL (ref 8.4–10.2)
CHLORIDE SERPL-SCNC: 96 MMOL/L (ref 98–107)
CO2 SERPL-SCNC: 21 MMOL/L (ref 22–30)
CREAT SERPL-SCNC: 3.58 UMOL/L (ref 0.52–1.25)
EOSINOPHIL # BLD: 0.2 10*3/UL (ref 0–0.7)
EOSINOPHIL NFR BLD: 2.3 % (ref 0–4)
ERYTHROCYTE [DISTWIDTH] IN BLOOD BY AUTOMATED COUNT: 16.5 % (ref 11.5–14.5)
GLUCOSE SERPL-MCNC: 261 MG/DL (ref 74–106)
GRANULOCYTES # BLD AUTO: 79.8 % (ref 42.2–75.2)
HCT VFR BLD AUTO: 23.8 % (ref 37–47)
HGB BLD-MCNC: 7.9 G/DL (ref 12.5–16)
LYMPHOCYTES # BLD: 0.6 10*3/UL (ref 1.2–3.4)
LYMPHOCYTES NFR BLD: 7.7 % (ref 20–51)
MCH RBC QN AUTO: 30 PG (ref 27–31)
MCHC RBC AUTO-ENTMCNC: 33 G/DL (ref 33–37)
MCV RBC AUTO: 89 FL (ref 80–100)
MONOCYTES # BLD: 0.8 10*3/UL (ref 0.1–0.6)
MONOCYTES NFR BLD AUTO: 9.2 % (ref 1.7–9.3)
NEUTROPHILS # BLD: 6.5 10*3/UL (ref 1.4–6.5)
PHOSPHATE SERPL-MCNC: 4.7 MG/DL (ref 2.5–4.5)
PLATELET # BLD AUTO: 182 K/MM3 (ref 130–400)
PMV BLD AUTO: 11 FL (ref 7.4–10.4)
POTASSIUM SERPL-SCNC: 3.9 MMOL/L (ref 3.4–5)
RBC # BLD AUTO: 2.67 M/MM3 (ref 4.1–5.3)
SODIUM SERPL-SCNC: 132 MMOL/L (ref 137–145)

## 2019-06-23 NOTE — NUR
Pt AAOx4 sitting up in chair with all required items within reach. Breakfast
order placed. No complaints at this time.

## 2019-06-23 NOTE — NUR
Pt. sitting up in chair at this time.  Pt. is A&OX3, assessment complete.  INT
to lt. forearm patent.  HD catheter to rt. chest patent.  Pt. denies pain or
other needs, call light within reach.

## 2019-06-23 NOTE — NUR
Patient transferred from medical unit.  Oriented to room and unit procedures.
Alert and oriented.  No c/o at this time.

## 2019-06-24 VITALS — SYSTOLIC BLOOD PRESSURE: 153 MMHG | TEMPERATURE: 98.5 F | DIASTOLIC BLOOD PRESSURE: 49 MMHG | HEART RATE: 89 BPM

## 2019-06-24 VITALS — DIASTOLIC BLOOD PRESSURE: 53 MMHG | SYSTOLIC BLOOD PRESSURE: 161 MMHG | TEMPERATURE: 98.9 F | HEART RATE: 109 BPM

## 2019-06-24 VITALS — HEART RATE: 98 BPM | DIASTOLIC BLOOD PRESSURE: 63 MMHG | SYSTOLIC BLOOD PRESSURE: 151 MMHG | TEMPERATURE: 98.2 F

## 2019-06-24 VITALS — TEMPERATURE: 98.6 F | SYSTOLIC BLOOD PRESSURE: 151 MMHG | DIASTOLIC BLOOD PRESSURE: 44 MMHG | HEART RATE: 93 BPM

## 2019-06-24 VITALS — SYSTOLIC BLOOD PRESSURE: 149 MMHG | HEART RATE: 98 BPM | DIASTOLIC BLOOD PRESSURE: 59 MMHG | TEMPERATURE: 98 F

## 2019-06-24 VITALS — DIASTOLIC BLOOD PRESSURE: 39 MMHG | SYSTOLIC BLOOD PRESSURE: 175 MMHG | TEMPERATURE: 98.2 F | HEART RATE: 107 BPM

## 2019-06-24 VITALS — DIASTOLIC BLOOD PRESSURE: 50 MMHG | HEART RATE: 97 BPM | TEMPERATURE: 98.5 F | SYSTOLIC BLOOD PRESSURE: 171 MMHG

## 2019-06-24 LAB
ALBUMIN SERPL-MCNC: 3.5 GM/DL (ref 3.5–5)
ANION GAP SERPL CALC-SCNC: 14 MMOL/L (ref 7–16)
BASOPHILS # BLD: 0 10*3/UL (ref 0–0.2)
BASOPHILS NFR BLD AUTO: 0.4 % (ref 0–2)
BUN SERPL-MCNC: 87 MG/DL (ref 7–17)
CALCIUM SERPL-MCNC: 9.5 MG/DL (ref 8.4–10.2)
CHLORIDE SERPL-SCNC: 95 MMOL/L (ref 98–107)
CO2 SERPL-SCNC: 22 MMOL/L (ref 22–30)
CREAT SERPL-SCNC: 4.48 UMOL/L (ref 0.52–1.25)
EOSINOPHIL # BLD: 0.3 10*3/UL (ref 0–0.7)
EOSINOPHIL NFR BLD: 3.7 % (ref 0–4)
ERYTHROCYTE [DISTWIDTH] IN BLOOD BY AUTOMATED COUNT: 16.3 % (ref 11.5–14.5)
GLUCOSE SERPL-MCNC: 246 MG/DL (ref 74–106)
GRANULOCYTES # BLD AUTO: 79.6 % (ref 42.2–75.2)
HCT VFR BLD AUTO: 25.1 % (ref 37–47)
HGB BLD-MCNC: 8.2 G/DL (ref 12.5–16)
LYMPHOCYTES # BLD: 0.7 10*3/UL (ref 1.2–3.4)
LYMPHOCYTES NFR BLD: 7.5 % (ref 20–51)
MCH RBC QN AUTO: 30 PG (ref 27–31)
MCHC RBC AUTO-ENTMCNC: 33 G/DL (ref 33–37)
MCV RBC AUTO: 91 FL (ref 80–100)
MONOCYTES # BLD: 0.8 10*3/UL (ref 0.1–0.6)
MONOCYTES NFR BLD AUTO: 8.2 % (ref 1.7–9.3)
NEUTROPHILS # BLD: 7.4 10*3/UL (ref 1.4–6.5)
PHOSPHATE SERPL-MCNC: 4.8 MG/DL (ref 2.5–4.5)
PLATELET # BLD AUTO: 170 K/MM3 (ref 130–400)
PMV BLD AUTO: 10.8 FL (ref 7.4–10.4)
POTASSIUM SERPL-SCNC: 3.9 MMOL/L (ref 3.4–5)
RBC # BLD AUTO: 2.77 M/MM3 (ref 4.1–5.3)
SODIUM SERPL-SCNC: 131 MMOL/L (ref 137–145)

## 2019-06-24 NOTE — NUR
PT. sitting  up in chair at this time.  Pt. is A&OX3, assessment complete.
INT to rt. wrist patent.  Pt. denies pain or other needs, call light within
reach.

## 2019-06-24 NOTE — NUR
Patient alert and oriented, answers questions appropraitely.  See assessment.
Right upper chest dialysis catheter with ecchymosis noted, dressing intact. 1+
edema noted to BLE, pulses palpable.  No c/o at this time.

## 2019-06-25 VITALS — TEMPERATURE: 97.7 F | DIASTOLIC BLOOD PRESSURE: 50 MMHG | HEART RATE: 91 BPM | SYSTOLIC BLOOD PRESSURE: 156 MMHG

## 2019-06-25 VITALS — DIASTOLIC BLOOD PRESSURE: 63 MMHG | SYSTOLIC BLOOD PRESSURE: 152 MMHG | HEART RATE: 95 BPM | TEMPERATURE: 97.6 F

## 2019-06-25 VITALS — SYSTOLIC BLOOD PRESSURE: 122 MMHG | TEMPERATURE: 99.7 F | DIASTOLIC BLOOD PRESSURE: 57 MMHG | HEART RATE: 98 BPM

## 2019-06-25 VITALS — DIASTOLIC BLOOD PRESSURE: 56 MMHG | SYSTOLIC BLOOD PRESSURE: 183 MMHG | TEMPERATURE: 99 F | HEART RATE: 104 BPM

## 2019-06-25 VITALS — SYSTOLIC BLOOD PRESSURE: 131 MMHG | DIASTOLIC BLOOD PRESSURE: 59 MMHG | TEMPERATURE: 98 F | HEART RATE: 85 BPM

## 2019-06-25 VITALS — HEART RATE: 97 BPM | DIASTOLIC BLOOD PRESSURE: 57 MMHG | SYSTOLIC BLOOD PRESSURE: 153 MMHG | TEMPERATURE: 98 F

## 2019-06-25 LAB
ALBUMIN SERPL-MCNC: 3.3 GM/DL (ref 3.5–5)
ANION GAP SERPL CALC-SCNC: 10 MMOL/L (ref 7–16)
BASOPHILS # BLD: 0 10*3/UL (ref 0–0.2)
BASOPHILS NFR BLD AUTO: 0.3 % (ref 0–2)
BUN SERPL-MCNC: 45 MG/DL (ref 7–17)
CALCIUM SERPL-MCNC: 9.7 MG/DL (ref 8.4–10.2)
CHLORIDE SERPL-SCNC: 99 MMOL/L (ref 98–107)
CO2 SERPL-SCNC: 27 MMOL/L (ref 22–30)
CREAT SERPL-SCNC: 2.98 UMOL/L (ref 0.52–1.25)
EOSINOPHIL # BLD: 0.3 10*3/UL (ref 0–0.7)
EOSINOPHIL NFR BLD: 3.7 % (ref 0–4)
ERYTHROCYTE [DISTWIDTH] IN BLOOD BY AUTOMATED COUNT: 16.4 % (ref 11.5–14.5)
GLUCOSE SERPL-MCNC: 309 MG/DL (ref 74–106)
GRANULOCYTES # BLD AUTO: 78.7 % (ref 42.2–75.2)
HCT VFR BLD AUTO: 25.7 % (ref 37–47)
HGB BLD-MCNC: 7.9 G/DL (ref 12.5–16)
LYMPHOCYTES # BLD: 0.6 10*3/UL (ref 1.2–3.4)
LYMPHOCYTES NFR BLD: 7.4 % (ref 20–51)
MCH RBC QN AUTO: 29 PG (ref 27–31)
MCHC RBC AUTO-ENTMCNC: 31 G/DL (ref 33–37)
MCV RBC AUTO: 94 FL (ref 80–100)
MONOCYTES # BLD: 0.8 10*3/UL (ref 0.1–0.6)
MONOCYTES NFR BLD AUTO: 9.2 % (ref 1.7–9.3)
NEUTROPHILS # BLD: 6.8 10*3/UL (ref 1.4–6.5)
PHOSPHATE SERPL-MCNC: 2.3 MG/DL (ref 2.5–4.5)
PLATELET # BLD AUTO: 188 K/MM3 (ref 130–400)
PMV BLD AUTO: 11.1 FL (ref 7.4–10.4)
POTASSIUM SERPL-SCNC: 4.2 MMOL/L (ref 3.4–5)
RBC # BLD AUTO: 2.74 M/MM3 (ref 4.1–5.3)
SODIUM SERPL-SCNC: 135 MMOL/L (ref 137–145)

## 2019-06-25 NOTE — NUR
Patient uncomfortable in her bed, assisted to the bathroom, voids 400cc of
yellow urine, then assisted to chair at bedside. Has oxygen on at 2L/nc. Call
light within reach.

## 2019-06-25 NOTE — NUR
Patient sitting up in chair. Npo after a light breakfast this am. PLans for
dialysis fistula late this afternoon.  She denies needing medication for pain,
report achy joints this am. Int to Rfa. Dialysis cath to right chest. Brusing
present. Patient has swelling to extremities. Will monitor

## 2019-06-25 NOTE — NUR
Patient taking renal diet at this time. Has SL to right wrist without redness
or swelling. Has dressing to left forearm with good thrill and noted bruit
present over new AV Fistula. Has right chest dialysis catheter with fading
bruising noted. Has numerous bruises to arms and abdomen. Wearing SCD's at
this time, lower leg edema noted.

## 2019-06-25 NOTE — NUR
NOHEMI met with the patient to review discharge plan and to discuss physical
therapies recommendation of home health and a front wheeled walker. The
patient reports that she would be interested in both. NOHEMI provided the patient
with Medicare.gov's list of home health agencies that serve [place]. The
patient chose [facility]. NOHEMI contacted and faxed a
referral to Jorge at [facility]. Jorge requested that NOHEMI notify the
patient's PCP of her wanting home health. NOHEMI contacted the patient's PCP's
office and notified Dr. Hughes's nurse. NOHEMI then discussed DME options and
presented and explained the patient choice form for DME to the patient. The
patient chose [facility] for a walker. Patient choice form
signed by the patient and she was provided a copy. NOHEMI faxed the patient's
information to Eddy at [facility]. NOHEMI will need to get the walker script signed by
the physician and then send to [facility]. NOHEMI to continue to
follow.

## 2019-06-26 VITALS — HEART RATE: 102 BPM | SYSTOLIC BLOOD PRESSURE: 142 MMHG | DIASTOLIC BLOOD PRESSURE: 54 MMHG | TEMPERATURE: 98.3 F

## 2019-06-26 VITALS — SYSTOLIC BLOOD PRESSURE: 168 MMHG | HEART RATE: 102 BPM | TEMPERATURE: 98.2 F | DIASTOLIC BLOOD PRESSURE: 43 MMHG

## 2019-06-26 VITALS — SYSTOLIC BLOOD PRESSURE: 139 MMHG | TEMPERATURE: 98 F | DIASTOLIC BLOOD PRESSURE: 46 MMHG | HEART RATE: 97 BPM

## 2019-06-26 LAB
ALBUMIN SERPL-MCNC: 3.3 GM/DL (ref 3.5–5)
ANION GAP SERPL CALC-SCNC: 9 MMOL/L (ref 7–16)
BASOPHILS # BLD: 0 10*3/UL (ref 0–0.2)
BASOPHILS NFR BLD AUTO: 0.4 % (ref 0–2)
BUN SERPL-MCNC: 49 MG/DL (ref 7–17)
CALCIUM SERPL-MCNC: 10 MG/DL (ref 8.4–10.2)
CHLORIDE SERPL-SCNC: 99 MMOL/L (ref 98–107)
CO2 SERPL-SCNC: 27 MMOL/L (ref 22–30)
CREAT SERPL-SCNC: 3.01 UMOL/L (ref 0.52–1.25)
EOSINOPHIL # BLD: 0.3 10*3/UL (ref 0–0.7)
EOSINOPHIL NFR BLD: 3.5 % (ref 0–4)
ERYTHROCYTE [DISTWIDTH] IN BLOOD BY AUTOMATED COUNT: 16.6 % (ref 11.5–14.5)
GLUCOSE SERPL-MCNC: 253 MG/DL (ref 74–106)
GRANULOCYTES # BLD AUTO: 78 % (ref 42.2–75.2)
HCT VFR BLD AUTO: 25.5 % (ref 37–47)
HGB BLD-MCNC: 7.9 G/DL (ref 12.5–16)
LYMPHOCYTES # BLD: 0.8 10*3/UL (ref 1.2–3.4)
LYMPHOCYTES NFR BLD: 9.2 % (ref 20–51)
MCH RBC QN AUTO: 29 PG (ref 27–31)
MCHC RBC AUTO-ENTMCNC: 31 G/DL (ref 33–37)
MCV RBC AUTO: 94 FL (ref 80–100)
MONOCYTES # BLD: 0.7 10*3/UL (ref 0.1–0.6)
MONOCYTES NFR BLD AUTO: 8.4 % (ref 1.7–9.3)
NEUTROPHILS # BLD: 6.3 10*3/UL (ref 1.4–6.5)
PHOSPHATE SERPL-MCNC: 3 MG/DL (ref 2.5–4.5)
PLATELET # BLD AUTO: 191 K/MM3 (ref 130–400)
PMV BLD AUTO: 10.7 FL (ref 7.4–10.4)
POTASSIUM SERPL-SCNC: 4.3 MMOL/L (ref 3.4–5)
RBC # BLD AUTO: 2.7 M/MM3 (ref 4.1–5.3)
SODIUM SERPL-SCNC: 135 MMOL/L (ref 137–145)

## 2019-06-26 PROCEDURE — 03LA3DZ OCCLUSION OF LEFT ULNAR ARTERY WITH INTRALUMINAL DEVICE, PERCUTANEOUS APPROACH: ICD-10-PCS | Performed by: SURGERY

## 2019-06-26 PROCEDURE — 031C0ZF BYPASS LEFT RADIAL ARTERY TO LOWER ARM VEIN, OPEN APPROACH: ICD-10-PCS | Performed by: SURGERY

## 2019-06-26 NOTE — NUR
Dr. Lundberg signed the script for the walker. SW contacted and faxed the script
to Eddy cadena [facility].

## 2019-06-26 NOTE — NUR
Patient sitting up in chair, working on breakfast. Patient ambulated halls
with PT, dyspnea with exertion. Left forearm dressing intact at new fistula
site. Dialysis cath to right chest, drg intact. Plans for dialysis today. She
denies pain & nausea at this time.

## 2019-06-26 NOTE — NUR
Chani, at [facility], reports that they can accept the
patient for services. NOHEMI informed the patient's .
 
The patient is to discharge back home with her  today, 6/26, with home
health services for skilled nursing/PT/OT through [facility]. The patient and her  plan to go to [facility] and
 her walker. NOHEMI presented and explained the IM form to the patient's
. The patient's  verbalized understanding, signed, and he was
provided a copy. No additional needs at this time.

## 2019-06-26 NOTE — NUR
Patient ready for discharge. Finished dialysis. She had lunch medicated with
insulin per orders. Patient given all discharge paperwork. We extensivly
reviewed med rec & last dose taken. She is aware of what meds She is going to
stop. She will watch blood sugars closely with her change in insulin regiment.
She will continue with dialysis Friday in [place]. We discussed signs &
symptoms of infections & closely monitoring fistula & dialysis cath. She will
not get dialysis cath wet. We reviewed renal diaylsis diet & ada diet. She
denies questions or concerns, Her  taking her home with all belongings.

## 2019-10-23 ENCOUNTER — HOSPITAL ENCOUNTER (OUTPATIENT)
Dept: HOSPITAL 19 - COL.CAR | Age: 79
Discharge: HOME | End: 2019-10-23
Attending: SURGERY
Payer: MEDICARE

## 2019-10-23 VITALS — HEIGHT: 55.98 IN | WEIGHT: 149.25 LBS | BODY MASS INDEX: 33.57 KG/M2

## 2019-10-23 VITALS — DIASTOLIC BLOOD PRESSURE: 69 MMHG | HEART RATE: 72 BPM | SYSTOLIC BLOOD PRESSURE: 154 MMHG

## 2019-10-23 VITALS — TEMPERATURE: 97.5 F | HEART RATE: 75 BPM | DIASTOLIC BLOOD PRESSURE: 67 MMHG | SYSTOLIC BLOOD PRESSURE: 162 MMHG

## 2019-10-23 VITALS — SYSTOLIC BLOOD PRESSURE: 158 MMHG | HEART RATE: 69 BPM | DIASTOLIC BLOOD PRESSURE: 66 MMHG

## 2019-10-23 VITALS — DIASTOLIC BLOOD PRESSURE: 73 MMHG | SYSTOLIC BLOOD PRESSURE: 172 MMHG | HEART RATE: 87 BPM

## 2019-10-23 VITALS — HEART RATE: 90 BPM | TEMPERATURE: 97 F | SYSTOLIC BLOOD PRESSURE: 180 MMHG | DIASTOLIC BLOOD PRESSURE: 72 MMHG

## 2019-10-23 VITALS — HEART RATE: 79 BPM | DIASTOLIC BLOOD PRESSURE: 68 MMHG | SYSTOLIC BLOOD PRESSURE: 156 MMHG

## 2019-10-23 VITALS — SYSTOLIC BLOOD PRESSURE: 154 MMHG | HEART RATE: 71 BPM | DIASTOLIC BLOOD PRESSURE: 66 MMHG

## 2019-10-23 DIAGNOSIS — Z90.49: ICD-10-CM

## 2019-10-23 DIAGNOSIS — E78.00: ICD-10-CM

## 2019-10-23 DIAGNOSIS — Z79.4: ICD-10-CM

## 2019-10-23 DIAGNOSIS — N18.6: ICD-10-CM

## 2019-10-23 DIAGNOSIS — Z79.82: ICD-10-CM

## 2019-10-23 DIAGNOSIS — T82.868A: Primary | ICD-10-CM

## 2019-10-23 DIAGNOSIS — Z88.2: ICD-10-CM

## 2019-10-23 DIAGNOSIS — E11.22: ICD-10-CM

## 2019-10-23 DIAGNOSIS — I12.0: ICD-10-CM

## 2019-10-23 PROCEDURE — A4216 STERILE WATER/SALINE, 10 ML: HCPCS

## 2019-10-23 NOTE — NUR
BEDSIDE REPORT TAKEN FROM RACHEL BRIAN.  PT WAS GIVEN TPA TO HELP WITH
DE-CLOTTING.  PT'S BANDAID WAS CHANGED DUE TO SATURATION.  RACHEL BRIAN
APPLIED PRESSURE FOR 5 MINUTES, NEW BANDAID APPLIED. Subjective:       Patient ID: Doron Domínguez is a 80 y.o. male.    Chief Complaint: Results; Chemotherapy; and Lung Cancer    HPI 80-year-old male completed 2 cycles of carboplatinum Taxol weekly for metastatic non-small cell lung carcinoma action able marker negative.  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    Hypertension     Lung cancer 01/2017    T3 N0 M0 bronchoalveolar     Family History   Problem Relation Age of Onset    Diabetes Mother     Diabetes Sister     Diabetes Brother      Social History     Social History    Marital status:      Spouse name: N/A    Number of children: N/A    Years of education: 3     Occupational History    Part-time employed      Social History Main Topics    Smoking status: Never Smoker    Smokeless tobacco: Never Used    Alcohol use No    Drug use: No    Sexual activity: No     Other Topics Concern    Not on file     Social History Narrative    Part-time employed, , 3 children.     Past Surgical History:   Procedure Laterality Date    APPENDECTOMY      CARDIAC SURGERY      CORONARY ARTERY BYPASS GRAFT  2012    CABG x 3 at [facility]    LUNG LOBECTOMY Left 01/10/2017    Left lower lobe       Labs:  Lab Results   Component Value Date    WBC 4.79 06/08/2017    HGB 12.2 (L) 06/08/2017    HCT 36.6 (L) 06/08/2017    MCV 93 06/08/2017     06/08/2017     BMP  Lab Results   Component Value Date     06/08/2017    K 4.3 06/08/2017     06/08/2017    CO2 26 06/08/2017    BUN 16 06/08/2017    CREATININE 0.9 06/08/2017    CALCIUM 8.8 06/08/2017    ANIONGAP 6 (L) 06/08/2017    ESTGFRAFRICA >60  06/08/2017    EGFRNONAA >60 06/08/2017     Lab Results   Component Value Date    ALT 25 06/08/2017    AST 23 06/08/2017    ALKPHOS 75 06/08/2017    BILITOT 0.5 06/08/2017       No results found for: IRON, TIBC, FERRITIN, SATURATEDIRO  No results found for: [id number]  No results found for: FOLATE  No results found for: TSH      Review of Systems   Constitutional: Negative for activity change, appetite change, chills, diaphoresis, fatigue, fever and unexpected weight change.   HENT: Negative for congestion, dental problem, drooling, ear discharge, ear pain, facial swelling, hearing loss, mouth sores, nosebleeds, postnasal drip, rhinorrhea, sinus pressure, sneezing, sore throat, tinnitus, trouble swallowing and voice change.    Eyes: Negative for photophobia, pain, discharge, redness, itching and visual disturbance.   Respiratory: Negative for apnea, cough, choking, chest tightness, shortness of breath, wheezing and stridor.    Cardiovascular: Negative for chest pain, palpitations and leg swelling.   Gastrointestinal: Negative for abdominal distention, abdominal pain, anal bleeding, blood in stool, constipation, diarrhea, nausea, rectal pain and vomiting.   Endocrine: Negative for cold intolerance, heat intolerance, polydipsia, polyphagia and polyuria.   Genitourinary: Negative for decreased urine volume, difficulty urinating, discharge, dysuria, enuresis, flank pain, frequency, genital sores, hematuria, penile pain, penile swelling, scrotal swelling, testicular pain and urgency.   Musculoskeletal: Negative for arthralgias, back pain, gait problem, joint swelling, myalgias, neck pain and neck stiffness.   Skin: Negative for color change, pallor, rash and wound.   Allergic/Immunologic: Negative for environmental allergies, food allergies and immunocompromised state.   Neurological: Negative for dizziness, tremors, seizures, syncope, facial asymmetry, speech difficulty, weakness, light-headedness, numbness and  headaches.   Hematological: Negative for adenopathy. Does not bruise/bleed easily.   Psychiatric/Behavioral: Positive for dysphoric mood. Negative for agitation, behavioral problems, confusion, decreased concentration, hallucinations, self-injury, sleep disturbance and suicidal ideas. The patient is nervous/anxious. The patient is not hyperactive.        Objective:      Physical Exam   Constitutional: He is oriented to person, place, and time. He appears well-developed and well-nourished. He appears distressed.   HENT:   Head: Normocephalic.   Right Ear: External ear normal.   Left Ear: External ear normal.   Nose: Nose normal. Right sinus exhibits no maxillary sinus tenderness and no frontal sinus tenderness. Left sinus exhibits no maxillary sinus tenderness and no frontal sinus tenderness.   Mouth/Throat: Oropharynx is clear and moist. No oropharyngeal exudate.   Eyes: EOM and lids are normal. Pupils are equal, round, and reactive to light. Right eye exhibits no discharge. Left eye exhibits no discharge. Right conjunctiva is not injected. Right conjunctiva has no hemorrhage. Left conjunctiva is not injected. Left conjunctiva has no hemorrhage. No scleral icterus. Right eye exhibits normal extraocular motion. Left eye exhibits normal extraocular motion.   Neck: Normal range of motion. Neck supple. No JVD present. No tracheal deviation present. No thyromegaly present.   Cardiovascular: Normal rate and regular rhythm.    Pulmonary/Chest: Effort normal. No stridor. No respiratory distress.   Abdominal: Soft. He exhibits no mass. There is no hepatosplenomegaly, splenomegaly or hepatomegaly. There is no tenderness.   Musculoskeletal: Normal range of motion. He exhibits no edema or tenderness.   Lymphadenopathy:        Head (right side): No posterior auricular and no occipital adenopathy present.        Head (left side): No posterior auricular and no occipital adenopathy present.     He has no cervical adenopathy.         Right cervical: No superficial cervical, no deep cervical and no posterior cervical adenopathy present.       Left cervical: No superficial cervical, no deep cervical and no posterior cervical adenopathy present.     He has no axillary adenopathy.        Right: No supraclavicular adenopathy present.        Left: No supraclavicular adenopathy present.   Neurological: He is alert and oriented to person, place, and time. He has normal strength. No cranial nerve deficit. Coordination normal.   Skin: Skin is dry. No rash noted. He is not diaphoretic. No cyanosis or erythema. Nails show no clubbing.   Psychiatric: He has a normal mood and affect. His behavior is normal. Judgment and thought content normal. Cognition and memory are normal.   Vitals reviewed.          Assessment:      1. Bronchiolo-alveolar adenocarcinoma of left lung    2. Bilateral lung cancer    3. Bronchorrhea    4. Malignant neoplasm metastatic to lung, unspecified laterality           Plan:   Review CT chest abdomen pelvis reveals stable findings no evidence of progression patient will be scheduled to see me in one week to begin cycle 3 day 1 of carboplatinum Taxol with reimaging in 2 additional cycles.  Photographs of imaging taken as well as hard copy of report given to them communicated through electronic patient portal as well

## 2019-10-23 NOTE — NUR
IV WAS DISCONTINUED BY RACHEL CARSON FROM [facility] CHARGE NURSE.  PT WAS
DISCHARGED VIA W/C TO THE CARE OF SPOUSE IN PRIVATE VEHICLE WITH DISCHARGE
INSTRUCTIONS IN HAND.

## 2019-10-23 NOTE — NUR
VERBAL ORDER FOR ALTEPLASE 5MG IN 5ML  RECEIVED FROM DR LEONE FOR CLOTTED AV
FISTULA. PHARMACY NOTIFIED AT THIS TIME, ORDER ENTERED. MED TO BE GIVEN BY
DR. LEONE DURING FISTULAGRAM PROCEDURE.

## 2019-10-23 NOTE — NUR
Patient transported back to  room 9 at this time. Patient hooked up to
monitoring equipment. VS stable, patient denies any pain. RACHEL Conde at
bedside. Visualized fistula puncture site with RN. Site oozing. Manual
pressure held for 5 minutes, hemostasis achieved. New band aid placed over
puncture site. MD made aware and will visit patient. Bed in locked and lowest
position, call light within reach.

## 2019-10-23 NOTE — NUR
ALL MEDICATIONS  GIVEN  VORB WITH MD. SEE MERGE  FOR ALL MEDICATION ADMIN
TIMES. SEE MERGE  FOR ALL RASS ASSESSMENTS DURING AND POST PROCEDURE.

## 2019-11-14 ENCOUNTER — HOSPITAL ENCOUNTER (OUTPATIENT)
Dept: HOSPITAL 19 - SDCO | Age: 79
Discharge: HOME | End: 2019-11-14
Attending: SURGERY
Payer: MEDICARE

## 2019-11-14 VITALS — SYSTOLIC BLOOD PRESSURE: 170 MMHG | DIASTOLIC BLOOD PRESSURE: 60 MMHG | HEART RATE: 73 BPM

## 2019-11-14 VITALS — TEMPERATURE: 97.5 F | HEART RATE: 80 BPM | DIASTOLIC BLOOD PRESSURE: 54 MMHG | SYSTOLIC BLOOD PRESSURE: 176 MMHG

## 2019-11-14 VITALS — DIASTOLIC BLOOD PRESSURE: 59 MMHG | HEART RATE: 69 BPM | SYSTOLIC BLOOD PRESSURE: 153 MMHG

## 2019-11-14 VITALS — SYSTOLIC BLOOD PRESSURE: 151 MMHG | DIASTOLIC BLOOD PRESSURE: 55 MMHG | HEART RATE: 69 BPM

## 2019-11-14 VITALS — DIASTOLIC BLOOD PRESSURE: 53 MMHG | SYSTOLIC BLOOD PRESSURE: 165 MMHG | HEART RATE: 68 BPM

## 2019-11-14 VITALS — DIASTOLIC BLOOD PRESSURE: 46 MMHG | HEART RATE: 78 BPM | TEMPERATURE: 98 F | SYSTOLIC BLOOD PRESSURE: 147 MMHG

## 2019-11-14 VITALS — BODY MASS INDEX: 33.13 KG/M2 | HEIGHT: 56 IN | WEIGHT: 147.27 LBS

## 2019-11-14 DIAGNOSIS — E78.5: ICD-10-CM

## 2019-11-14 DIAGNOSIS — J44.9: ICD-10-CM

## 2019-11-14 DIAGNOSIS — I13.2: Primary | ICD-10-CM

## 2019-11-14 DIAGNOSIS — K21.9: ICD-10-CM

## 2019-11-14 DIAGNOSIS — I34.0: ICD-10-CM

## 2019-11-14 DIAGNOSIS — E11.40: ICD-10-CM

## 2019-11-14 DIAGNOSIS — E03.9: ICD-10-CM

## 2019-11-14 DIAGNOSIS — F32.9: ICD-10-CM

## 2019-11-14 DIAGNOSIS — I27.20: ICD-10-CM

## 2019-11-14 DIAGNOSIS — Z99.81: ICD-10-CM

## 2019-11-14 DIAGNOSIS — I50.30: ICD-10-CM

## 2019-11-14 DIAGNOSIS — Z88.2: ICD-10-CM

## 2019-11-14 DIAGNOSIS — E11.22: ICD-10-CM

## 2019-11-14 DIAGNOSIS — Z79.899: ICD-10-CM

## 2019-11-14 DIAGNOSIS — Z79.82: ICD-10-CM

## 2019-11-14 DIAGNOSIS — Z99.2: ICD-10-CM

## 2019-11-14 DIAGNOSIS — F41.9: ICD-10-CM

## 2019-11-14 DIAGNOSIS — E21.3: ICD-10-CM

## 2019-11-14 DIAGNOSIS — N18.6: ICD-10-CM

## 2019-11-14 DIAGNOSIS — J96.20: ICD-10-CM

## 2019-11-14 DIAGNOSIS — D63.1: ICD-10-CM

## 2019-11-14 DIAGNOSIS — Z79.4: ICD-10-CM

## 2019-11-14 NOTE — NUR
Pt requests head of bed up.  After raising, pt states she feels much better.
Pt requesting ice water, applesauce, and [place].

## 2019-11-14 NOTE — NUR
Patient has met discharge criteria. Discharge instructions discussed, denies
any questions, and verbalizes understanding. Changes to clothing
independently. Escorted to exit via wheelchair. Discharged to home with ride
in private vehicle at 1255.

## 2019-11-14 NOTE — NUR
Patient arrives to [facility] for recovery, accompanied by OR RN and CRNA.
Received by Danilo Grady RN. Patient is sleepy, but easily aroused to
voice. Her  is brought to the bedside. Monitoring applied - VSS and WNL
on room air.

## 2019-11-14 NOTE — NUR
Pt returns from OR procedure via cart with RN and CRNA assist.  Pt has eyes
closed but answers all questions appropriately.  Monitors on and alarms set.
Call light within reach.  Report received from RACHEL Vega and RUDI Bae.  Pt
reports no pain or nausea.   brought to room.

## 2019-11-14 NOTE — NUR
This RN assumed care at this time. Patient is alert and oriented. She is
sitting up in bed, eating applesauce and drinking water. She denies any pain,
nausea, or need. Her fistula has a thrill and bruit. Her operative site is
clean, dry, intact.

## 2019-11-14 NOTE — NUR
Pt taking food and drink well.  Pt remains with no complaints of pain, nausea,
or anything else.  Op site has no visualized complications.  Fingerstick blood
glucose is 152.

## 2019-11-14 NOTE — NUR
Patient is resting comfortably in room. States "I'm ready to go home". Will
return with discharge papers.

## 2020-05-21 ENCOUNTER — HOSPITAL ENCOUNTER (OUTPATIENT)
Dept: HOSPITAL 19 - COL.CAR | Age: 80
Discharge: HOME | End: 2020-05-21
Attending: INTERNAL MEDICINE
Payer: MEDICARE

## 2020-05-21 VITALS — SYSTOLIC BLOOD PRESSURE: 158 MMHG | HEART RATE: 69 BPM | DIASTOLIC BLOOD PRESSURE: 62 MMHG

## 2020-05-21 VITALS — SYSTOLIC BLOOD PRESSURE: 129 MMHG | DIASTOLIC BLOOD PRESSURE: 84 MMHG | TEMPERATURE: 97 F | HEART RATE: 67 BPM

## 2020-05-21 VITALS — WEIGHT: 150.13 LBS | HEIGHT: 55.98 IN | BODY MASS INDEX: 33.77 KG/M2

## 2020-05-21 DIAGNOSIS — Z88.2: ICD-10-CM

## 2020-05-21 DIAGNOSIS — Z99.2: ICD-10-CM

## 2020-05-21 DIAGNOSIS — Z79.82: ICD-10-CM

## 2020-05-21 DIAGNOSIS — T82.868A: Primary | ICD-10-CM

## 2020-05-21 DIAGNOSIS — N18.6: ICD-10-CM

## 2020-05-21 PROCEDURE — 27591 AMPUTATE LEG AT THIGH: CPT

## 2020-06-09 ENCOUNTER — HOSPITAL ENCOUNTER (OUTPATIENT)
Dept: HOSPITAL 19 - SDCO | Age: 80
End: 2020-06-09
Attending: SURGERY
Payer: MEDICARE

## 2020-06-09 VITALS — SYSTOLIC BLOOD PRESSURE: 138 MMHG | DIASTOLIC BLOOD PRESSURE: 62 MMHG | HEART RATE: 80 BPM | TEMPERATURE: 97.6 F

## 2020-06-09 VITALS — SYSTOLIC BLOOD PRESSURE: 127 MMHG | HEART RATE: 112 BPM | DIASTOLIC BLOOD PRESSURE: 44 MMHG

## 2020-06-09 VITALS — HEART RATE: 113 BPM | DIASTOLIC BLOOD PRESSURE: 53 MMHG | SYSTOLIC BLOOD PRESSURE: 124 MMHG

## 2020-06-09 VITALS — BODY MASS INDEX: 32.34 KG/M2 | WEIGHT: 149.91 LBS | HEIGHT: 57 IN

## 2020-06-09 VITALS — DIASTOLIC BLOOD PRESSURE: 51 MMHG | SYSTOLIC BLOOD PRESSURE: 136 MMHG | HEART RATE: 108 BPM

## 2020-06-09 VITALS — DIASTOLIC BLOOD PRESSURE: 66 MMHG | SYSTOLIC BLOOD PRESSURE: 136 MMHG | HEART RATE: 107 BPM

## 2020-06-09 DIAGNOSIS — Z99.2: ICD-10-CM

## 2020-06-09 DIAGNOSIS — E11.22: Primary | ICD-10-CM

## 2020-06-09 DIAGNOSIS — K21.9: ICD-10-CM

## 2020-06-09 DIAGNOSIS — I50.9: ICD-10-CM

## 2020-06-09 DIAGNOSIS — Z79.899: ICD-10-CM

## 2020-06-09 DIAGNOSIS — E11.42: ICD-10-CM

## 2020-06-09 DIAGNOSIS — N18.6: ICD-10-CM

## 2020-06-09 DIAGNOSIS — E78.00: ICD-10-CM

## 2020-06-09 DIAGNOSIS — I27.20: ICD-10-CM

## 2020-06-09 DIAGNOSIS — R20.2: ICD-10-CM

## 2020-06-09 DIAGNOSIS — Z79.4: ICD-10-CM

## 2020-06-09 DIAGNOSIS — Z79.82: ICD-10-CM

## 2020-06-09 DIAGNOSIS — J44.9: ICD-10-CM

## 2020-06-09 DIAGNOSIS — D64.9: ICD-10-CM

## 2020-06-09 DIAGNOSIS — I13.2: ICD-10-CM

## 2020-06-09 DIAGNOSIS — I25.10: ICD-10-CM

## 2020-06-09 DIAGNOSIS — M10.9: ICD-10-CM

## 2020-06-09 DIAGNOSIS — E78.5: ICD-10-CM

## 2020-06-09 DIAGNOSIS — E11.21: ICD-10-CM

## 2020-06-09 DIAGNOSIS — Z88.2: ICD-10-CM

## 2020-06-09 PROCEDURE — C1768 GRAFT, VASCULAR: HCPCS

## 2020-06-09 NOTE — NUR
T0 RM 1 PER CART FROM OR. ALERT ORIENTED X3 TALKING TO STAFF AND .
DENIES PAIN OR DISCOMFORT AND SLIGHT NAUEA.
ROMERO SET OVER INCISION CLEAN DRY AND INTACT.
FAINT BRUIT

## 2020-07-07 ENCOUNTER — HOSPITAL ENCOUNTER (OUTPATIENT)
Dept: HOSPITAL 19 - COL.RAD | Age: 80
End: 2020-07-07
Attending: INTERNAL MEDICINE
Payer: MEDICARE

## 2020-07-07 VITALS — BODY MASS INDEX: 32.82 KG/M2 | WEIGHT: 152.12 LBS | HEIGHT: 57.01 IN

## 2020-07-07 VITALS — SYSTOLIC BLOOD PRESSURE: 163 MMHG | HEART RATE: 86 BPM | DIASTOLIC BLOOD PRESSURE: 71 MMHG

## 2020-07-07 VITALS — DIASTOLIC BLOOD PRESSURE: 57 MMHG | SYSTOLIC BLOOD PRESSURE: 152 MMHG | HEART RATE: 76 BPM

## 2020-07-07 DIAGNOSIS — N18.6: ICD-10-CM

## 2020-07-07 DIAGNOSIS — Z45.2: Primary | ICD-10-CM

## 2020-07-07 DIAGNOSIS — Z99.2: ICD-10-CM

## 2020-07-09 ENCOUNTER — HOSPITAL ENCOUNTER (OUTPATIENT)
Dept: HOSPITAL 19 - SDCO | Age: 80
End: 2020-07-09
Attending: SURGERY
Payer: MEDICARE

## 2020-07-09 VITALS — SYSTOLIC BLOOD PRESSURE: 125 MMHG | DIASTOLIC BLOOD PRESSURE: 52 MMHG | HEART RATE: 73 BPM

## 2020-07-09 VITALS — HEIGHT: 57 IN | WEIGHT: 148.15 LBS | BODY MASS INDEX: 31.96 KG/M2

## 2020-07-09 VITALS — HEART RATE: 70 BPM | SYSTOLIC BLOOD PRESSURE: 140 MMHG | DIASTOLIC BLOOD PRESSURE: 57 MMHG

## 2020-07-09 VITALS — HEART RATE: 81 BPM | DIASTOLIC BLOOD PRESSURE: 49 MMHG | SYSTOLIC BLOOD PRESSURE: 128 MMHG | TEMPERATURE: 97.6 F

## 2020-07-09 VITALS — TEMPERATURE: 98.2 F | HEART RATE: 80 BPM | DIASTOLIC BLOOD PRESSURE: 58 MMHG | SYSTOLIC BLOOD PRESSURE: 139 MMHG

## 2020-07-09 DIAGNOSIS — K57.30: ICD-10-CM

## 2020-07-09 DIAGNOSIS — Z20.828: ICD-10-CM

## 2020-07-09 DIAGNOSIS — K31.7: ICD-10-CM

## 2020-07-09 DIAGNOSIS — Z79.82: ICD-10-CM

## 2020-07-09 DIAGNOSIS — Z99.2: ICD-10-CM

## 2020-07-09 DIAGNOSIS — Z90.49: ICD-10-CM

## 2020-07-09 DIAGNOSIS — I50.9: ICD-10-CM

## 2020-07-09 DIAGNOSIS — D50.0: ICD-10-CM

## 2020-07-09 DIAGNOSIS — Z79.4: ICD-10-CM

## 2020-07-09 DIAGNOSIS — I13.2: ICD-10-CM

## 2020-07-09 DIAGNOSIS — Z88.2: ICD-10-CM

## 2020-07-09 DIAGNOSIS — D12.3: Primary | ICD-10-CM

## 2020-07-09 DIAGNOSIS — J44.9: ICD-10-CM

## 2020-07-09 DIAGNOSIS — E11.69: ICD-10-CM

## 2020-07-09 DIAGNOSIS — I25.10: ICD-10-CM

## 2020-07-09 DIAGNOSIS — N18.6: ICD-10-CM

## 2020-07-09 DIAGNOSIS — E78.00: ICD-10-CM

## 2020-07-09 DIAGNOSIS — I27.20: ICD-10-CM

## 2020-07-09 DIAGNOSIS — K21.9: ICD-10-CM

## 2020-07-09 DIAGNOSIS — E11.22: ICD-10-CM

## 2020-07-09 NOTE — NUR
0720 ATTEMPTED X 1 TO START IV IN R FOOT. UNABLE TO START. RUDI COATES STARTED
20 G IV TO R LE X 1 ATTEMPT. PATIENT TOLERATED WELL.

## 2020-07-09 NOTE — NUR
0900 P[ATIENT ATE HER LIZZIE CRACKERS. TOLERATED PO WELL. IV TO RLE D/C'D.
CATH INTACT. WRAPPED WITH COBAN OVER A COTTON BALL. D/C INSTRUCTIONS AND
PATIENT EDUCATION REGARDING MODERATE SEDATION, COLON POLYPECTOMY AND
DIVERTICULOSIS VERBAL AND WRITTEN GIVEN TO PATIENT. PATIENT VERBALIZED
UNDERSTANDING.

## 2020-07-09 NOTE — NUR
0910 PATIENT ALERT AND ORIENTED. TOLERAING PO WELL. DENIES COMPLAINT. PATIENT
D'C'D TO POV VIA W/C.  DRIVING. WRITTEN D/C INSTRUTIONS SENT WITH
PATIENT. PATIENT HAS PERSONAL BELONGINGS INCLUDING A KU PURSE WITH HER UPON
DISCHARGE.

## 2020-07-09 NOTE — NUR
0815 PATIENT ARRIVES TO [facility] VIA CART. AMBULATED TO CHAIR WITH WALKER.
GAIT STEADY. VSS. PT. REQUESTED DIET SPRITE AND SUGAR FREE JELLO. DENIES
COMPLAINT.

## 2022-09-27 ENCOUNTER — HOSPITAL ENCOUNTER (OUTPATIENT)
Dept: HOSPITAL 19 - COL.CAR | Age: 82
End: 2022-09-27
Attending: NURSE PRACTITIONER
Payer: MEDICARE

## 2022-09-27 VITALS — HEART RATE: 89 BPM | SYSTOLIC BLOOD PRESSURE: 148 MMHG | DIASTOLIC BLOOD PRESSURE: 64 MMHG

## 2022-09-27 VITALS — SYSTOLIC BLOOD PRESSURE: 187 MMHG | TEMPERATURE: 97.3 F | DIASTOLIC BLOOD PRESSURE: 79 MMHG | HEART RATE: 94 BPM

## 2022-09-27 VITALS — SYSTOLIC BLOOD PRESSURE: 149 MMHG | DIASTOLIC BLOOD PRESSURE: 61 MMHG | HEART RATE: 85 BPM

## 2022-09-27 VITALS — WEIGHT: 155.21 LBS | HEIGHT: 56 IN | BODY MASS INDEX: 34.91 KG/M2

## 2022-09-27 VITALS — HEART RATE: 87 BPM | SYSTOLIC BLOOD PRESSURE: 142 MMHG | DIASTOLIC BLOOD PRESSURE: 62 MMHG

## 2022-09-27 VITALS — DIASTOLIC BLOOD PRESSURE: 64 MMHG | HEART RATE: 84 BPM | SYSTOLIC BLOOD PRESSURE: 150 MMHG

## 2022-09-27 VITALS — SYSTOLIC BLOOD PRESSURE: 141 MMHG | DIASTOLIC BLOOD PRESSURE: 68 MMHG | HEART RATE: 86 BPM

## 2022-09-27 VITALS — DIASTOLIC BLOOD PRESSURE: 61 MMHG | HEART RATE: 87 BPM | SYSTOLIC BLOOD PRESSURE: 148 MMHG

## 2022-09-27 VITALS — HEART RATE: 91 BPM | SYSTOLIC BLOOD PRESSURE: 152 MMHG | DIASTOLIC BLOOD PRESSURE: 93 MMHG

## 2022-09-27 VITALS — DIASTOLIC BLOOD PRESSURE: 56 MMHG | SYSTOLIC BLOOD PRESSURE: 141 MMHG | HEART RATE: 87 BPM

## 2022-09-27 DIAGNOSIS — Z79.899: ICD-10-CM

## 2022-09-27 DIAGNOSIS — I12.0: ICD-10-CM

## 2022-09-27 DIAGNOSIS — E11.22: ICD-10-CM

## 2022-09-27 DIAGNOSIS — T82.858A: Primary | ICD-10-CM

## 2022-09-27 DIAGNOSIS — N18.6: ICD-10-CM

## 2022-09-27 DIAGNOSIS — Z79.84: ICD-10-CM

## 2022-09-27 DIAGNOSIS — Z99.2: ICD-10-CM

## 2022-09-27 DIAGNOSIS — Z79.4: ICD-10-CM

## 2022-09-27 DIAGNOSIS — Z79.82: ICD-10-CM

## 2022-09-27 DIAGNOSIS — T82.838A: ICD-10-CM

## 2022-09-27 PROCEDURE — C1725 CATH, TRANSLUMIN NON-LASER: HCPCS

## 2022-09-27 PROCEDURE — C1769 GUIDE WIRE: HCPCS

## 2022-09-27 PROCEDURE — C1894 INTRO/SHEATH, NON-LASER: HCPCS

## 2022-09-27 NOTE — NUR
See merge for all medication, assessment, intervention, and vital sign times.
See merge for initial vitals.

## 2022-09-27 NOTE — NUR
Discharge instructions given to pt.Pt verbalizes understanding.Pt escorted out
via wheelchair by this nurse.